# Patient Record
Sex: FEMALE
[De-identification: names, ages, dates, MRNs, and addresses within clinical notes are randomized per-mention and may not be internally consistent; named-entity substitution may affect disease eponyms.]

---

## 2022-07-19 ENCOUNTER — DASHBOARD ENCOUNTERS (OUTPATIENT)
Age: 22
End: 2022-07-19

## 2022-07-19 ENCOUNTER — WEB ENCOUNTER (OUTPATIENT)
Dept: URBAN - METROPOLITAN AREA CLINIC 111 | Facility: CLINIC | Age: 22
End: 2022-07-19

## 2022-07-19 ENCOUNTER — OFFICE VISIT (OUTPATIENT)
Dept: URBAN - METROPOLITAN AREA CLINIC 111 | Facility: CLINIC | Age: 22
End: 2022-07-19
Payer: OTHER GOVERNMENT

## 2022-07-19 VITALS
TEMPERATURE: 97.7 F | HEART RATE: 101 BPM | WEIGHT: 161.2 LBS | BODY MASS INDEX: 30.43 KG/M2 | SYSTOLIC BLOOD PRESSURE: 124 MMHG | DIASTOLIC BLOOD PRESSURE: 85 MMHG | HEIGHT: 61 IN

## 2022-07-19 DIAGNOSIS — R19.8 ALTERNATING CONSTIPATION AND DIARRHEA: ICD-10-CM

## 2022-07-19 DIAGNOSIS — R58 BLOOD ON TOILET PAPER: ICD-10-CM

## 2022-07-19 DIAGNOSIS — R10.9 ABDOMINAL CRAMPING: ICD-10-CM

## 2022-07-19 DIAGNOSIS — K21.9 GASTROESOPHAGEAL REFLUX DISEASE, UNSPECIFIED WHETHER ESOPHAGITIS PRESENT: ICD-10-CM

## 2022-07-19 PROBLEM — 235595009: Status: ACTIVE | Noted: 2022-07-19

## 2022-07-19 PROCEDURE — 99204 OFFICE O/P NEW MOD 45 MIN: CPT | Performed by: NURSE PRACTITIONER

## 2022-07-19 RX ORDER — DICYCLOMINE HYDROCHLORIDE 20 MG/1
1 TABLET TABLET ORAL THREE TIMES A DAY
Qty: 30 TABLET | Refills: 1 | OUTPATIENT

## 2022-07-19 NOTE — HPI-TODAY'S VISIT:
21 yo female presents with mom for intermittent abd cramping and diarrhea for 2 months. Reports abd bloating/cramping especially in the morning with alternating loose bowel movement and constipation. She also has burning sensation in stomach especially after eating greasy food. Denies nausea, vomiting, dysphagia or melena. She noticed blood on toilet tissue few times when constipated after straining for bm. She initially had loose bm 3-4x/day. She now has stomach cramping and have bm x1 in the morning, usually feels better after bm. Denies fever, chills, rashes, joint pains, weight loss or nocturnal diarrhea. Denies fh gi malignancy or IBD.